# Patient Record
(demographics unavailable — no encounter records)

---

## 2025-05-01 NOTE — REASON FOR VISIT
[Initial Evaluation] : an initial evaluation of [Chest Pain] : chest pain [Family History] : family history [Cardiomyopathy] : cardiomyopathy [Patient] : patient [Mother] : mother

## 2025-05-07 NOTE — DISCUSSION/SUMMARY
[May participate in all age-appropriate activities] : [unfilled] May participate in all age-appropriate activities. [FreeTextEntry1] : In summary, TYE is a 7-year male with chest pain during rest and exercise.  I discussed at length with the family that these symptoms are not likely related to cardiac pathology. We discussed the more common causes of chest pain in children, including musculoskeletal pain, pulmonary conditions such as asthma, and gastrointestinal conditions such as reflux.  He is likely feeling musculoskeletal chest pain. He should maintain good hydration. He should drink about 48-64 ounces of non-caffeinated beverages per day. Caffeinated beverages should be avoided. His fluid intake should be titrated to keep his urine dilute.  There is a family history of hypertrophic cardiomyopathy and sudden death. He does not have evidence of left ventricular hypertrophy at this time, but I explained that HCM may present at any age.   His mother is genotype positive for HCM. and he needs to be tested.  I recommend that he be tested for that specific gene. If he has that specific gene which causes HCM, then he could receive appropriate management, which could be lifesaving. If he does not have that specific gene, then he would not be at increased risk of HCM compared to the general population. I also recommended that his siblings have cardiac evaluation and genetic testing.   No restrictions are needed  Routine pediatric cardiology follow-up is in 1 year, earlier if there are recurrent episodes of chest pain which do not appear to be musculoskeletal, or if there are any other cardiac concerns.  The family verbalized understanding, and all questions were answered.  His mother and all paternal first-degree relatives of childbearing age should get fetal echocardiogram done during all future pregnancies, as there is higher risk of having congenital heart disease in future children d/t family history of VSD repair.  [Needs SBE Prophylaxis] : [unfilled] does not need bacterial endocarditis prophylaxis [PE + No Restrictions] : [unfilled] may participate in the entire physical education program without restriction, including all varsity competitive sports. [Influenza vaccine is recommended] : Influenza vaccine is recommended

## 2025-05-07 NOTE — CONSULT LETTER
[Today's Date] : [unfilled] [Name] : Name: [unfilled] [] : : ~~ [Today's Date:] : [unfilled] [Dear  ___:] : Dear Dr. [unfilled]: [Consult] : I had the pleasure of evaluating your patient, [unfilled]. My full evaluation follows. [Consult - Single Provider] : Thank you very much for allowing me to participate in the care of this patient. If you have any questions, please do not hesitate to contact me. [Sincerely,] : Sincerely, [FreeTextEntry4] : Fede Jones MD [FreeTextEntry5] : [address] [FreeTextEntry6] : [place] [address] [de-identified] : Dequan Ko MD, FAAP, FACC, [facility] Pediatric Cardiologist [facility]

## 2025-05-07 NOTE — REVIEW OF SYSTEMS
[Chest Pain] : chest pain  or discomfort [Palpitations] : palpitations [Fast HR] : tachycardia [Dizziness] : dizziness [Feeling Poorly] : not feeling poorly (malaise) [Fever] : no fever [Wgt Loss (___ Lbs)] : no recent weight loss [Pallor] : not pale [Eye Discharge] : no eye discharge [Redness] : no redness [Change in Vision] : no change in vision [Nasal Stuffiness] : no nasal congestion [Sore Throat] : no sore throat [Earache] : no earache [Loss Of Hearing] : no hearing loss [Cyanosis] : no cyanosis [Edema] : no edema [Diaphoresis] : not diaphoretic [Exercise Intolerance] : no persistence of exercise intolerance [Orthopnea] : no orthopnea [Nosebleeds] : no epistaxis [Tachypnea] : not tachypneic [Wheezing] : no wheezing [Cough] : no cough [Shortness Of Breath] : not expressed as feeling short of breath [Being A Poor Eater] : not a poor eater [Vomiting] : no vomiting [Diarrhea] : no diarrhea [Decrease In Appetite] : appetite not decreased [Abdominal Pain] : no abdominal pain [Fainting (Syncope)] : no fainting [Seizure] : no seizures [Headache] : no headache [Limping] : no limping [Joint Pains] : no arthralgias [Joint Swelling] : no joint swelling [Rash] : no rash [Wound problems] : no wound problems [Skin Peeling] : no skin peeling [Easy Bruising] : no tendency for easy bruising [Swollen Glands] : no lymphadenopathy [Easy Bleeding] : no ~M tendency for easy bleeding [Sleep Disturbances] : ~T no sleep disturbances [Hyperactive] : no hyperactive behavior [Failure To Thrive] : no failure to thrive [Short Stature] : short stature was not noted [Jitteriness] : no jitteriness [Heat/Cold Intolerance] : no temperature intolerance [Dec Urine Output] : no oliguria

## 2025-05-07 NOTE — HISTORY OF PRESENT ILLNESS
[FreeTextEntry1] : TYE is a 7 1/2-year-old male who was referred for cardiology consultation due to chest pain. The chest pain began 5 years ago, and since then has been occurring every few months 1 times a day. The pain is center, left and right in location, is described as punched, 6/10 in severity, and does not radiate. The pain occurs at rest and during exercise, lasts approximately 1-2 minutes, and resolves spontaneously. The chest pain is worse with palpation, movement and inspiration. TYE has tried no medication to relieve the symptoms, which has been effective. The chest pain is associated with palpitations, shortness of breath, diaphoresis, lightheadedness, or nausea. TYE has never had syncope. There has been no recent change in activity level, no fatigue, and no difficulty gaining weight or weight loss. He is active and has had no recent decrease in exercise endurance.  Past Medical History: TYE is a 6-year boy who was referred for cardiac consultation due to chest pain and his family history of hypertrophic cardiomyopathy.  He complained of chest pain about 5 times in the last yr, the last time was 2 months ago, Mother was with him each time. It occurred while walking, running around the house, rolling down a big hill.  It was a sharp pain, upper midline chest. He relaxed, remained still, drank water and it resolved in 5 minutes. No associated symptoms  Sometimes says his heart is beeping when he is running, he is in no distress and his HR seemed normal  He has been active and otherwise asymptomatic. There has been no other complaint of chest pain, palpitations, dyspnea, dizziness or syncope.   plays soccer  Family history:  Mother's first cousin's child - 14 yo  suddenly while jumping on a trampoline. Although the family history of HCM was known, she was not brought to a cardiologist.  Mother - genotype+ HCM, last cardiac evaluation > 15 yrs ago, normal at that time.  MGM is 64 years - genotype+ HCM, s/p septal ablation, ICD was recommended Paternal aunt had VSD repair and was seen by me in the past. Maternal great uncle - HCM, with ICD sister- 6 yo, no cardiac evaluation  sister 2 1/3 yo,  no cardiac evaluation

## 2025-05-07 NOTE — CARDIOLOGY SUMMARY
[Today's Date] : [unfilled] [LVSF ___%] : LV Shortening Fraction [unfilled]% [FreeTextEntry1] : For Chest pain Normal sinus rhythm, normal QRS axis, normal intervals (QTc 425 msec), no hypertrophy, no pre-excitation, no ST segment or T wave abnormalities. Normal EKG for age.  [FreeTextEntry2] : Normal intracardiac anatomy.  LV dimensions and shortening fraction were normal.  No pericardial effusion. No evidence of left ventricular hypertrophy.